# Patient Record
Sex: FEMALE | Race: BLACK OR AFRICAN AMERICAN
[De-identification: names, ages, dates, MRNs, and addresses within clinical notes are randomized per-mention and may not be internally consistent; named-entity substitution may affect disease eponyms.]

---

## 2017-04-18 ENCOUNTER — HOSPITAL ENCOUNTER (EMERGENCY)
Dept: HOSPITAL 17 - NEPC | Age: 27
Discharge: HOME | End: 2017-04-18
Payer: SELF-PAY

## 2017-04-18 VITALS
OXYGEN SATURATION: 99 % | HEART RATE: 85 BPM | RESPIRATION RATE: 18 BRPM | DIASTOLIC BLOOD PRESSURE: 59 MMHG | TEMPERATURE: 98.6 F | SYSTOLIC BLOOD PRESSURE: 96 MMHG

## 2017-04-18 VITALS
SYSTOLIC BLOOD PRESSURE: 107 MMHG | DIASTOLIC BLOOD PRESSURE: 55 MMHG | OXYGEN SATURATION: 100 % | HEART RATE: 68 BPM | RESPIRATION RATE: 16 BRPM

## 2017-04-18 VITALS — BODY MASS INDEX: 27.34 KG/M2 | WEIGHT: 154.32 LBS | HEIGHT: 63 IN

## 2017-04-18 DIAGNOSIS — R11.2: ICD-10-CM

## 2017-04-18 DIAGNOSIS — N94.6: Primary | ICD-10-CM

## 2017-04-18 LAB
ALP SERPL-CCNC: 71 U/L (ref 45–117)
ALT SERPL-CCNC: 18 U/L (ref 10–53)
ANION GAP SERPL CALC-SCNC: 7 MEQ/L (ref 5–15)
AST SERPL-CCNC: 13 U/L (ref 15–37)
BASOPHILS # BLD AUTO: 0.1 TH/MM3 (ref 0–0.2)
BASOPHILS NFR BLD: 1 % (ref 0–2)
BILIRUB SERPL-MCNC: 0.2 MG/DL (ref 0.2–1)
BUN SERPL-MCNC: 11 MG/DL (ref 7–18)
CHLORIDE SERPL-SCNC: 107 MEQ/L (ref 98–107)
COLOR UR: YELLOW
COMMENT (UR): (no result)
CULTURE IF INDICATED: (no result)
EOSINOPHIL # BLD: 0.3 TH/MM3 (ref 0–0.4)
EOSINOPHIL NFR BLD: 2.8 % (ref 0–4)
ERYTHROCYTE [DISTWIDTH] IN BLOOD BY AUTOMATED COUNT: 15.2 % (ref 11.6–17.2)
GFR SERPLBLD BASED ON 1.73 SQ M-ARVRAT: 103 ML/MIN (ref 89–?)
GLUCOSE UR STRIP-MCNC: (no result) MG/DL
HCO3 BLD-SCNC: 27.3 MEQ/L (ref 21–32)
HCT VFR BLD CALC: 38.1 % (ref 35–46)
HEMO FLAGS: (no result)
HGB UR QL STRIP: (no result)
KETONES UR STRIP-MCNC: (no result) MG/DL
LYMPHOCYTES # BLD AUTO: 2.1 TH/MM3 (ref 1–4.8)
LYMPHOCYTES NFR BLD AUTO: 21.1 % (ref 9–44)
MCH RBC QN AUTO: 27.3 PG (ref 27–34)
MCHC RBC AUTO-ENTMCNC: 33.4 % (ref 32–36)
MCV RBC AUTO: 81.7 FL (ref 80–100)
MONOCYTES NFR BLD: 6.7 % (ref 0–8)
MUCOUS THREADS #/AREA URNS LPF: (no result) /LPF
NEUTROPHILS # BLD AUTO: 6.7 TH/MM3 (ref 1.8–7.7)
NEUTROPHILS NFR BLD AUTO: 68.4 % (ref 16–70)
NITRITE UR QL STRIP: (no result)
PLATELET # BLD: 168 TH/MM3 (ref 150–450)
POTASSIUM SERPL-SCNC: 3.8 MEQ/L (ref 3.5–5.1)
RBC # BLD AUTO: 4.66 MIL/MM3 (ref 4–5.3)
SODIUM SERPL-SCNC: 141 MEQ/L (ref 136–145)
SP GR UR STRIP: 1.01 (ref 1–1.03)
SQUAMOUS #/AREA URNS HPF: 1 /HPF (ref 0–5)
WBC # BLD AUTO: 9.8 TH/MM3 (ref 4–11)

## 2017-04-18 PROCEDURE — 81001 URINALYSIS AUTO W/SCOPE: CPT

## 2017-04-18 PROCEDURE — 74020: CPT

## 2017-04-18 PROCEDURE — 85025 COMPLETE CBC W/AUTO DIFF WBC: CPT

## 2017-04-18 PROCEDURE — 96374 THER/PROPH/DIAG INJ IV PUSH: CPT

## 2017-04-18 PROCEDURE — 99284 EMERGENCY DEPT VISIT MOD MDM: CPT

## 2017-04-18 PROCEDURE — 83690 ASSAY OF LIPASE: CPT

## 2017-04-18 PROCEDURE — 96361 HYDRATE IV INFUSION ADD-ON: CPT

## 2017-04-18 PROCEDURE — 96375 TX/PRO/DX INJ NEW DRUG ADDON: CPT

## 2017-04-18 PROCEDURE — 80053 COMPREHEN METABOLIC PANEL: CPT

## 2017-04-18 NOTE — PD
HPI


Chief Complaint:  Abdominal Pain


Time Seen by Provider:  04:51


Travel History


International Travel<30 days:  No


Contact w/Intl Traveler<30days:  No


Traveled to known affect area:  No





History of Present Illness


HPI


The patient is a 26 year old female who presents to the [facility] 

emergency department with a history of sharp abdominal pain in the 

periumbilical and suprapubic area that began at 3 AM.  The patient reports that 

she awoke with the pain.  She reports that the pain has been constant since the 

onset.  She reports that she's had vomiting 1 associated with this.  She 

denies having any diarrhea.  Her last bowel movement was earlier today.  She 

denies having any blood in her stool or black or tarry stools.  She reports 

that her last menstrual cycle was 4 weeks ago.  She denies using any type of 

contraceptive.  She is unsure whether she could be pregnant.  The patient 

denies having any dysuria, hematuria, urinary urgency, or frequency.  On review 

of systems, the patient denies any recent fevers, cough, congestion, neck pain, 

chest pain, shortness of breath, or neurologic symptoms.





[facility]


Past Medical History


*** Narrative Medical


The patient's past medical history is reportedly none.


Medical History:  Denies Significant Hx


Immunizations Current:  Yes


Tetanus Vaccination:  < 5 Years


Influenza Vaccination:  Yes


Pregnant?:  Not Pregnant


:  2


Para:  2


Miscarriage:  0


:  0





Past Surgical History


*** Narrative Surgical


The patient's past surgical history is reportedly none.


Surgical History:  No Previous Surgery





Social History


Alcohol Use:  No


Tobacco Use:  No


Substance Use:  No





Allergies-Medications


(Allergen,Severity, Reaction):  


Coded Allergies:  


     No Known Allergies (Verified , 16)


Reported Meds & Prescriptions





Reported Meds & Active Scripts


Active


Zofran Odt (Ondansetron Odt) 4 Mg Tab 4 Mg SL Q6HR PRN


Naproxen EC (Naproxen) 500 Mg Tabdr 500 Mg PO BID PRN








Review of Systems


Except as stated in HPI:  all other systems reviewed are Neg


General / Constitutional:  No: Fever


Eyes:  No: Visual changes


HENT:  No: Headaches


Cardiovascular:  No: Chest Pain or Discomfort


Respiratory:  No: Shortness of Breath


Gastrointestinal:  Positive: Nausea, Vomiting, Abdominal Pain,  No: Diarrhea, 

Hematemesis, Hematochezia, Constipation, Changes in Bowel Habits, Indigestion, 

Loss of Appetite


Genitourinary:  No: Urgency, Frequency, Dysuria


Musculoskeletal:  No: Pain


Skin:  No Rash


Neurologic:  No: Weakness


Psychiatric:  No: Depression


Endocrine:  No: Polydipsia


Hematologic/Lymphatic:  No: Easy Bruising





Physical Exam


Narrative


General: 


The patient is a well-developed well-nourished female in no acute distress. 





Head and Neck exam: 


Head is normocephalic atraumatic. 


Eyes: EOMI, pupils are equal round and reactive to light. 


Nose: Midline septum with pink mucous membranes 


Mouth: Dentition unremarkable. Moist mucus membranes. Posterior oropharynx is 

not erythematous. No tonsillar hypertrophy. Uvula midline. Airway patent. 


Neck: No palpable lymphadenopathy. No nuchal rigidity. No thyromegaly. 





Cardiovascular: 


Regular rate and rhythm without murmurs, gallops, or rubs. 





Lungs: 


Clear to auscultation bilaterally. No wheezes, rhonchi, or rales.


 


Abdomen:


Soft, with reported discomfort on palpation of the periumbilical area, no other 

tenderness on palpation of the other quadrants of the abdomen.  No guarding, 

rebound, or rigidity.  Normal bowel sounds are audible.  No tenderness on 

palpation of McBurney's point.  Negative [place] sign. 





Extremities: 


No clubbing, cyanosis, or edema.  No calf tenderness on palpation.





Back: 


No spinous process tenderness to palpation. No costovertebral angle tenderness 

to palpation. 





Neurologic Exam: Grossly nonfocal.  





Skin Exam: No rash noted. Intact skin that is warm and dry.





Data


Data


Last Documented VS





Vital Signs








  Date Time  Temp Pulse Resp B/P Pulse Ox O2 Delivery O2 Flow Rate FiO2


 


17 04:35  68 16 107/55 100 Room Air  


 


17 03:35 98.6       








Orders





 Complete Blood Count With Diff (17 04:52)


Comprehensive Metabolic Panel (17 04:52)


Lipase (17 04:52)


Urinalysis - C+S If Indicated (17 04:52)


Iv Access Insert/Monitor (17 04:52)


Ecg Monitoring (17 04:52)


Oximetry (17 04:52)


Ondansetron Inj (Zofran Inj) (17 05:00)


Sodium Chlor 0.9% 1000 Ml Inj (Ns 1000 M (17 04:52)


Sodium Chloride 0.9% Flush (Ns Flush) (17 05:00)


Ed Urine Pregnancytest Poc (17 04:52)


Ketorolac Inj (Toradol Inj) (17 05:15)





Labs





 Laboratory Tests








Test 17





 05:15 05:40


 


White Blood Count 9.8 TH/MM3 


 


Red Blood Count 4.66 MIL/MM3 


 


Hemoglobin 12.7 GM/DL 


 


Hematocrit 38.1 % 


 


Mean Corpuscular Volume 81.7 FL 


 


Mean Corpuscular Hemoglobin 27.3 PG 


 


Mean Corpuscular Hemoglobin 33.4 % 





Concent  


 


Red Cell Distribution Width 15.2 % 


 


Platelet Count 168 TH/MM3 


 


Mean Platelet Volume 10.3 FL 


 


Neutrophils (%) (Auto) 68.4 % 


 


Lymphocytes (%) (Auto) 21.1 % 


 


Monocytes (%) (Auto) 6.7 % 


 


Eosinophils (%) (Auto) 2.8 % 


 


Basophils (%) (Auto) 1.0 % 


 


Neutrophils # (Auto) 6.7 TH/MM3 


 


Lymphocytes # (Auto) 2.1 TH/MM3 


 


Monocytes # (Auto) 0.7 TH/MM3 


 


Eosinophils # (Auto) 0.3 TH/MM3 


 


Basophils # (Auto) 0.1 TH/MM3 


 


CBC Comment DIFF FINAL  


 


Differential Comment   


 


Sodium Level 141 MEQ/L 


 


Potassium Level 3.8 MEQ/L 


 


Chloride Level 107 MEQ/L 


 


Carbon Dioxide Level 27.3 MEQ/L 


 


Anion Gap 7 MEQ/L 


 


Blood Urea Nitrogen 11 MG/DL 


 


Creatinine 0.82 MG/DL 


 


Estimat Glomerular Filtration 103 ML/MIN 





Rate  


 


Random Glucose 90 MG/DL 


 


Calcium Level 8.7 MG/DL 


 


Total Bilirubin 0.2 MG/DL 


 


Aspartate Amino Transf 13 U/L 





(AST/SGOT)  


 


Alanine Aminotransferase 18 U/L 





(ALT/SGPT)  


 


Alkaline Phosphatase 71 U/L 


 


Total Protein 7.1 GM/DL 


 


Albumin 3.6 GM/DL 


 


Lipase 125 U/L 


 


Urine Color  YELLOW 


 


Urine Turbidity  CLEAR 


 


Urine pH  5.0 


 


Urine Specific Gravity  1.015 


 


Urine Protein  NEG mg/dL


 


Urine Glucose (UA)  NEG mg/dL


 


Urine Ketones  NEG mg/dL


 


Urine Occult Blood  LARGE 


 


Urine Nitrite  NEG 


 


Urine Bilirubin  NEG 


 


Urine Urobilinogen  LESS THAN 2.0





  MG/DL


 


Urine Leukocyte Esterase  SMALL 


 


Urine RBC  9 /hpf


 


Urine WBC  3 /hpf


 


Urine Squamous Epithelial  1 /hpf





Cells  


 


Urine Mucus  FEW /lpf


 


Microscopic Urinalysis Comment  CULT NOT





  INDICATED











MDM


Medical Decision Making


Medical Screen Exam Complete:  Yes


Emergency Medical Condition:  Yes


Medical Record Reviewed:  Yes


Differential Diagnosis


Trapped gas, versus dysmenorrhea, versus ectopic pregnancy, versus threatened 

miscarriage, versus constipation, versus urinary tract infection


Narrative Course


During the course of the patients emergency department visit, the patients 

history, examination, and differential diagnosis were reviewed with the 

patient. The patient had  IV access obtained and blood work sent for analysis.  

The patient was placed on a cardiac monitor with oximetry and blood pressure 

monitoring.  A bedside pregnancy test was done and was negative.





The patient was initially provided normal saline 1 L IV fluid bolus.  The 

patient was given Toradol for pain, Zofran for nausea.





The patients laboratory studies were reviewed and remarkable for a CBC that is 

within normal limits, CMP is unremarkable, lipase 125, urinalysis shows large 

blood, 9 RBCs, however the patient while being observed in the emergency 

department began her menstrual cycle.





I suspect of the patient's symptoms are related to dysmenorrhea.  The patient 

will be discharged home with a prescription for an anti-inflammatory pain 

medication and Zofran.





The patient is resting comfortably and feels better, is alert and in no 

distress. The patients results and examination findings were discussed with 

the patient. The repeat examination is unremarkable and benign. The history, 

exam, diagnostic testing, and current condition do not suggest any significant 

pathology to warrant further testing, continued ED treatment, admission, or 

surgical evaluation at this point. The vital signs have been stable. The 

patient does not have uncontrollable pain, intractable vomiting, or other 

significant symptoms. The patient's condition is stable and appropriate for 

discharge. The patient will pursue further outpatient evaluation with a primary 

care physician or other designated or consulting physician as indicated in the 

discharge instructions. The patient expressed understanding and was agreeable 

with this plan.





Diagnosis





 Primary Impression:  


 Dysmenorrhea


 Additional Impression:  


 Vomiting


 Qualified Code:  R11.2 - Non-intractable vomiting with nausea, unspecified 

vomiting type


Referrals:  


Gynecologist


1 week


Patient Instructions:  Acute Nausea and Vomiting (ED), Dysmenorrhea (ED), 

General Instructions


***Med/Other Pt SpecificInfo:  Prescription(s) given


Scripts


Ondansetron Odt (Zofran Odt)4 Mg Tab4 Mg SL Q6HR PRN (Nausea/Vomiting) #7 TAB  

Ref 0


   Prov:Misti Thomason MD         17 


Naproxen DR (Naproxen EC)500 Mg [id number] Mg PO BID PRN (PAIN GREATER THAN 5) #

10 TAB  Ref 0


   Prov:Misti Thomason MD         17


Disposition:  01 DISCHARGE HOME


Condition:  Misti Brand MD 2017 05:09


Condition:  Misti Brand MD 2017 05:09

## 2017-04-19 NOTE — RADRPT
EXAM DATE/TIME:  04/18/2017 07:08 

 

[facility] COMPARISON:     

No previous studies available for comparison.

 

                     

INDICATIONS :     

Abdominal pain and vomiting.

                     

 

MEDICAL HISTORY :     

None.          

 

SURGICAL HISTORY :     

None.   

 

ENCOUNTER:     

Initial                                        

 

ACUITY:     

1 day      

 

PAIN SCORE:     

8/10

 

LOCATION:     

Right lower quadrant 

 

FINDINGS:     

Supine and upright views of the abdomen were performed.  The abdominal bowel gas pattern is normal.  
There is stool throughout the colon. No air fluid levels are seen.  No abnormal masses, calcification
s, or organomegaly is seen.  The visualized lower lungs are clear.  No evidence of free intraperitone
al gas.  The osseous structures are unremarkable.

 

CONCLUSION:     

Normal examination for a patient of this age.  

 

 

 

 David Rocha MD on April 18, 2017 at 7:38           

Board Certified Radiologist.

 This report was verified electronically.

## 2018-01-26 ENCOUNTER — HOSPITAL ENCOUNTER (EMERGENCY)
Dept: HOSPITAL 17 - NEPD | Age: 28
Discharge: HOME | End: 2018-01-26
Payer: COMMERCIAL

## 2018-01-26 VITALS — HEIGHT: 60 IN | BODY MASS INDEX: 24.45 KG/M2 | WEIGHT: 124.56 LBS

## 2018-01-26 VITALS — TEMPERATURE: 97.8 F | OXYGEN SATURATION: 99 % | HEART RATE: 92 BPM | RESPIRATION RATE: 14 BRPM

## 2018-01-26 DIAGNOSIS — A59.9: ICD-10-CM

## 2018-01-26 DIAGNOSIS — O98.811: Primary | ICD-10-CM

## 2018-01-26 LAB
COLOR UR: YELLOW
GLUCOSE UR STRIP-MCNC: (no result) MG/DL
HGB UR QL STRIP: (no result)
KETONES UR STRIP-MCNC: (no result) MG/DL
MUCOUS THREADS #/AREA URNS LPF: (no result) /LPF
NITRITE UR QL STRIP: (no result)
SP GR UR STRIP: 1.01 (ref 1–1.03)
SQUAMOUS #/AREA URNS HPF: <1 /HPF (ref 0–5)
TRICHOMONAS, URINE: (no result)
URINE LEUKOCYTE ESTERASE: (no result)

## 2018-01-26 PROCEDURE — 96361 HYDRATE IV INFUSION ADD-ON: CPT

## 2018-01-26 PROCEDURE — 99284 EMERGENCY DEPT VISIT MOD MDM: CPT

## 2018-01-26 PROCEDURE — 87210 SMEAR WET MOUNT SALINE/INK: CPT

## 2018-01-26 PROCEDURE — 87491 CHLMYD TRACH DNA AMP PROBE: CPT

## 2018-01-26 PROCEDURE — 87591 N.GONORRHOEAE DNA AMP PROB: CPT

## 2018-01-26 PROCEDURE — 96374 THER/PROPH/DIAG INJ IV PUSH: CPT

## 2018-01-26 PROCEDURE — 81001 URINALYSIS AUTO W/SCOPE: CPT

## 2018-01-26 NOTE — PD
HPI


Chief Complaint:  Gyn Problem/Complaint


Time Seen by Provider:  09:56


Travel History


International Travel<30 days:  No


Contact w/Intl Traveler<30days:  No


Traveled to known affect area:  No





History of Present Illness


HPI


The patient is a 27-year-old  [place] female who presents to the 

emergency department for bleeding in pregnancy.  The patient is  Ab1 whose 

last menstrual cycle was on 2017.  The patient states she went to 

the bathroom earlier today and had a small amount of bright red blood on the 

toilet paper, is unsure if this was vaginal or rectal.  She does complain of 

mild lower abdominal pelvic cramping.  She is not scheduled to see an 

obstetrician to the beginning of February.  She denies any fever, chills, sweats

, dysuria, frequency, or urgency.  She does complain of mild nausea without any 

vomiting.  Symptoms are mild to moderate, possibly exacerbated by pregnancy, 

and there are no current alleviating factors.





[facility]


Past Medical History


Medical History:  Denies Significant Hx


Immunizations Current:  Yes


Tetanus Vaccination:  < 5 Years


Pregnant?:  Pregnant


LMP:  15 weeks


:  3


Para:  2


Miscarriage:  0


:  1





Past Surgical History


Surgical History:  No Previous Surgery





Social History


Alcohol Use:  No


Tobacco Use:  No


Substance Use:  No





Allergies-Medications


(Allergen,Severity, Reaction):  


Coded Allergies:  


     No Known Allergies (Verified  Adverse Reaction, Unknown, 18)


Reported Meds & Prescriptions





Reported Meds & Active Scripts


Active


Flagyl (Metronidazole) 500 Mg Tab 500 Mg PO BID 7 Days








Review of Systems


Except as stated in HPI:  all other systems reviewed are Neg


General / Constitutional:  No: Fever


Cardiovascular:  No: Chest Pain or Discomfort


Respiratory:  No: Shortness of Breath


Gastrointestinal:  Positive: Nausea, No: Vomiting, Abdominal Pain


Genitourinary:  Positive: Pelvic Pain, Vaginal Bleeding, No: Urgency, Frequency

, Dysuria, Discharge





Physical Exam


Narrative


GENERAL: Awake, alert, pleasant 27 year-old female who appears her stated age 

and is in no acute respiratory distress.


SKIN: Focused skin assessment warm/dry.


HEAD: Atraumatic. Normocephalic. 


EYES: No injection or drainage.


ENT: No nasal bleeding or discharge.  Mucous membranes pink and moist.


NECK: Trachea midline. No JVD. 


CARDIOVASCULAR: Regular rate and rhythm.  No murmur appreciated.


RESPIRATORY: No accessory muscle use. Clear to auscultation. Breath sounds 

equal bilaterally. 


GASTROINTESTINAL: Abdomen soft, gravid inferior to the umbilicus.  No guarding 

or rigidity.


Pelvic: Exam was performed in the presence of a female nurse.  External 

examination reveals no rashes or lesions.  Speculum examination reveals that 

the cervix is closed, mild erythema over the superior and inferior aspect of 

the cervical os.  Yellow foamy discharge in the vaginal vault.  Wet prep and 

gonorrhea/chlamydia were sent to lab.


MUSCULOSKELETAL: No obvious deformities. No clubbing.  No cyanosis.  No edema. 


NEUROLOGICAL: Awake and alert. No obvious cranial nerve deficits.  Motor 

grossly within normal limits. Normal speech.


PSYCHIATRIC: Appropriate mood and affect; insight and judgment normal.





Data


Data


Last Documented VS





Vital Signs








  Date Time  Temp Pulse Resp B/P (MAP) Pulse Ox O2 Delivery O2 Flow Rate FiO2


 


18 08:26 97.8 92 14  99   








Orders





 Orders


Ed Poc Ultrasound (18 )


Gc And Chlamydia Pcr (18 10:07)


Wet Prep Profile (18 10:07)


Urinalysis - C+S If Indicated (18 10:07)


Iv Access Insert/Monitor (18 10:07)


Sodium Chlor 0.9% 1000 Ml Inj (Ns 1000 M (18 10:07)


Ondansetron Inj (Zofran Inj) (18 10:15)





Labs





Laboratory Tests








Test


  18


10:27 18


10:40


 


Clue Cells (Wet Prep) NONE SEEN  


 


Vaginal Trichomonas (Wet Prep) PRESENT  


 


Vaginal Yeast (Wet Prep) NONE SEEN  


 


Urine Color  YELLOW 


 


Urine Turbidity  CLEAR 


 


Urine pH  7.0 


 


Urine Specific Gravity  1.013 


 


Urine Protein  NEG mg/dL 


 


Urine Glucose (UA)  NEG mg/dL 


 


Urine Ketones  NEG mg/dL 


 


Urine Occult Blood  NEG 


 


Urine Nitrite  NEG 


 


Urine Bilirubin  NEG 


 


Urine Urobilinogen


  


  LESS THAN 2.0


MG/DL


 


Urine Leukocyte Esterase  SMALL 


 


Urine RBC  1 /hpf 


 


Urine WBC  7 /hpf 


 


Urine Squamous Epithelial


Cells 


  <1 /hpf 


 


 


Urine Mucus  FEW /lpf 


 


Urine Trichomonas  RARE 


 


Microscopic Urinalysis Comment


  


  CULT NOT


INDICATED











MDM


Medical Decision Making


Medical Screen Exam Complete:  Yes


Emergency Medical Condition:  Yes


Medical Record Reviewed:  Yes


Interpretation(s)





Laboratory Tests








Test


  18


10:27 18


10:40


 


Clue Cells (Wet Prep) NONE SEEN  


 


Vaginal Trichomonas (Wet Prep) PRESENT  


 


Vaginal Yeast (Wet Prep) NONE SEEN  


 


Urine Color  YELLOW 


 


Urine Turbidity  CLEAR 


 


Urine pH  7.0 


 


Urine Specific Gravity  1.013 


 


Urine Protein  NEG mg/dL 


 


Urine Glucose (UA)  NEG mg/dL 


 


Urine Ketones  NEG mg/dL 


 


Urine Occult Blood  NEG 


 


Urine Nitrite  NEG 


 


Urine Bilirubin  NEG 


 


Urine Urobilinogen


  


  LESS THAN 2.0


MG/DL


 


Urine Leukocyte Esterase  SMALL 


 


Urine RBC  1 /hpf 


 


Urine WBC  7 /hpf 


 


Urine Squamous Epithelial


Cells 


  <1 /hpf 


 


 


Urine Mucus  FEW /lpf 


 


Urine Trichomonas  RARE 


 


Microscopic Urinalysis Comment


  


  CULT NOT


INDICATED








Differential Diagnosis


Differential diagnosis includes pregnancy, ectopic pregnancy, threatened AB, UTI

, vaginitis, cervicitis, Trichomonas, bacterial vaginosis.


Narrative Course


IV was established, and the patient was placed on cardiac telemetry monitoring 

and continuous pulse oximetry monitoring.  I performed an ultrasound at bedside 

which reveals an IUP with positive fetal heart rate and positive fetal heart 

tones.  A pelvic exam was completed in the presence of a female nurse.  UA was 

sent to lab.  Wet prep and gonorrhea/80 were sent to lab.  The patient's blood 

type on 2016 was O+, therefore, no indication for RhoGAM.  UA 

reveals 7 wbc's.  Wet prep is positive for Trichomonas.  The patient was 

treated with Flagyl 5 mg twice a day for one week.  She is advised to follow-up 

with her obstetrician.  Return if symptoms worsen or progress.





Procedures


**Procedure Narrative**


A bedside ultrasound was performed with a curvilinear probe which reveals an 

IUP with positive fetal heart rate and positive fetal movement.  The patient 

was shown the ultrasound as it was performed real-time.  The patient tolerated 

the procedure without difficulty and there was no complications.





Diagnosis





 Primary Impression:  


 Trichomonas infection


 Additional Impression:  


 Pregnancy


 Qualified Codes:  Z34.90 - Encounter for supervision of normal pregnancy, 

unspecified, unspecified trimester


Patient Instructions:  General Instructions





***Additional Instructions:  


Medication as directed.  Follow-up with her obstetrician.  Take a prenatal 

vitamin daily.  Return if symptoms worsen or progress.


***Med/Other Pt SpecificInfo:  Prescription(s) given


Scripts


Metronidazole (Flagyl) 500 Mg Tab


500 MG PO BID for Infection for 7 Days, #14 TAB 0 Refills


   Prov: Matthew Smith MD         18


Disposition:   DISCHARGE HOME


Condition:  Stable











Matthew Smith MD 2018 10:12